# Patient Record
Sex: MALE | Race: BLACK OR AFRICAN AMERICAN | Employment: FULL TIME | URBAN - METROPOLITAN AREA
[De-identification: names, ages, dates, MRNs, and addresses within clinical notes are randomized per-mention and may not be internally consistent; named-entity substitution may affect disease eponyms.]

---

## 2023-04-30 ENCOUNTER — HOSPITAL ENCOUNTER (EMERGENCY)
Age: 48
Discharge: HOME | End: 2023-04-30
Attending: EMERGENCY MEDICINE
Payer: COMMERCIAL

## 2023-04-30 VITALS
SYSTOLIC BLOOD PRESSURE: 153 MMHG | WEIGHT: 253.97 LBS | HEIGHT: 78 IN | OXYGEN SATURATION: 100 % | DIASTOLIC BLOOD PRESSURE: 93 MMHG | HEART RATE: 106 BPM | RESPIRATION RATE: 20 BRPM | TEMPERATURE: 100 F | BODY MASS INDEX: 29.38 KG/M2

## 2023-04-30 DIAGNOSIS — I10 PRIMARY HYPERTENSION: Primary | ICD-10-CM

## 2023-04-30 PROCEDURE — 6370000000 HC RX 637 (ALT 250 FOR IP): Performed by: EMERGENCY MEDICINE

## 2023-04-30 PROCEDURE — 99283 EMERGENCY DEPT VISIT LOW MDM: CPT

## 2023-04-30 PROCEDURE — 93005 ELECTROCARDIOGRAM TRACING: CPT | Performed by: EMERGENCY MEDICINE

## 2023-04-30 RX ORDER — AMLODIPINE BESYLATE 5 MG/1
5 TABLET ORAL ONCE
Status: COMPLETED | OUTPATIENT
Start: 2023-04-30 | End: 2023-04-30

## 2023-04-30 RX ORDER — AMLODIPINE BESYLATE 5 MG/1
5 TABLET ORAL DAILY
Qty: 30 TABLET | Refills: 0 | Status: SHIPPED | OUTPATIENT
Start: 2023-04-30

## 2023-04-30 RX ADMIN — AMLODIPINE BESYLATE 5 MG: 5 TABLET ORAL at 21:29

## 2023-04-30 ASSESSMENT — ENCOUNTER SYMPTOMS
RESPIRATORY NEGATIVE: 1
ABDOMINAL PAIN: 0
SHORTNESS OF BREATH: 0
EYES NEGATIVE: 1
STRIDOR: 0
BACK PAIN: 0
WHEEZING: 0
GASTROINTESTINAL NEGATIVE: 1

## 2023-05-01 LAB
EKG ATRIAL RATE: 96 BPM
EKG DIAGNOSIS: NORMAL
EKG P AXIS: 48 DEGREES
EKG P-R INTERVAL: 152 MS
EKG Q-T INTERVAL: 348 MS
EKG QRS DURATION: 86 MS
EKG QTC CALCULATION (BAZETT): 439 MS
EKG R AXIS: 33 DEGREES
EKG T AXIS: 20 DEGREES
EKG VENTRICULAR RATE: 96 BPM

## 2023-05-01 PROCEDURE — 93010 ELECTROCARDIOGRAM REPORT: CPT | Performed by: INTERNAL MEDICINE

## 2023-05-01 NOTE — ED NOTES
Meds given per MD order.    BP has improved    DC pt home in stable condition     Ruby Blair RN  04/30/23 [id number]

## 2023-05-01 NOTE — ED PROVIDER NOTES
[address] ENCOUNTER        Pt Name: Jose Irene  MRN: [id number]  [place] 1975  Date of evaluation: 4/30/2023  Provider: Bogdan Myers MD  PCP: Kimberley Castorena MD  Note Started: 9:19 PM EDT 4/30/23    CHIEF COMPLAINT       Chief Complaint   Patient presents with    Hypertension    Allergic Reaction       HISTORY OF PRESENT ILLNESS: 1 or more Elements     History from : Patient    Limitations to history : None    Jose Irene is a 49 y.o. male who presents for hypertension. Patient states he has a history of hypertension was previously on amlodipine and an ARB but he had been taken off amlodipine. He has otherwise been doing well with diet and exercise however he states he ate hot sauce tonight with scotch bonnet peppers and he felt his blood pressure rising. He denies any shortness of breath he denies any rash she denies any chest pain he denies any nausea vomiting diarrhea he states he just feels blood pressure going high. This made him anxious. He checked his blood pressure multiple times and it was going up. He was concerned so he decided to come to the ER. He has no other symptoms at this time. Nursing Notes were all reviewed and agreed with or any disagreements were addressed in the HPI. REVIEW OF SYSTEMS :      Review of Systems   Constitutional: Negative. Negative for chills and fever. HENT: Negative. Eyes: Negative. Respiratory: Negative. Negative for shortness of breath, wheezing and stridor. Cardiovascular: Negative. Negative for chest pain. Gastrointestinal: Negative. Negative for abdominal pain. Genitourinary: Negative. Musculoskeletal: Negative. Negative for back pain. Skin: Negative. Negative for rash. Neurological: Negative. Negative for headaches. Psychiatric/Behavioral:  The patient is nervous/anxious. Positives and Pertinent negatives as per HPI.      SURGICAL HISTORY     Past

## 2023-05-01 NOTE — ED NOTES
Meds given per MD order. DC instructions and RX given. Pt verbalized understanding.  DC Home in stable condition     Jose Alejandro RN  04/30/23 [id number]